# Patient Record
Sex: FEMALE | Race: WHITE | Employment: OTHER | URBAN - METROPOLITAN AREA
[De-identification: names, ages, dates, MRNs, and addresses within clinical notes are randomized per-mention and may not be internally consistent; named-entity substitution may affect disease eponyms.]

---

## 2017-01-06 ENCOUNTER — TELEPHONE (OUTPATIENT)
Dept: INTERNAL MEDICINE CLINIC | Age: 72
End: 2017-01-06

## 2017-01-23 ENCOUNTER — NURSE ONLY (OUTPATIENT)
Dept: INTERNAL MEDICINE CLINIC | Age: 72
End: 2017-01-23

## 2017-01-23 DIAGNOSIS — Z23 NEED FOR HEPATITIS A IMMUNIZATION: Primary | ICD-10-CM

## 2017-01-23 PROCEDURE — 90471 IMMUNIZATION ADMIN: CPT | Performed by: INTERNAL MEDICINE

## 2017-01-23 PROCEDURE — 90632 HEPA VACCINE ADULT IM: CPT | Performed by: INTERNAL MEDICINE

## 2017-01-26 RX ORDER — ESTRADIOL AND NORETHINDRONE ACETATE 1; .5 MG/1; MG/1
TABLET ORAL
Qty: 28 TABLET | Refills: 3 | Status: SHIPPED | OUTPATIENT
Start: 2017-01-26 | End: 2017-03-22 | Stop reason: ALTCHOICE

## 2017-01-27 ENCOUNTER — TELEPHONE (OUTPATIENT)
Dept: INTERNAL MEDICINE CLINIC | Age: 72
End: 2017-01-27

## 2017-01-31 ENCOUNTER — OFFICE VISIT (OUTPATIENT)
Dept: INTERNAL MEDICINE CLINIC | Age: 72
End: 2017-01-31

## 2017-01-31 VITALS
TEMPERATURE: 97.7 F | HEART RATE: 68 BPM | DIASTOLIC BLOOD PRESSURE: 64 MMHG | WEIGHT: 143 LBS | BODY MASS INDEX: 24.41 KG/M2 | HEIGHT: 64 IN | SYSTOLIC BLOOD PRESSURE: 138 MMHG

## 2017-01-31 DIAGNOSIS — Z23 NEED FOR HEPATITIS B VACCINATION: ICD-10-CM

## 2017-01-31 DIAGNOSIS — K57.32 DIVERTICULITIS OF LARGE INTESTINE WITHOUT PERFORATION OR ABSCESS WITHOUT BLEEDING: Primary | ICD-10-CM

## 2017-01-31 PROCEDURE — 90746 HEPB VACCINE 3 DOSE ADULT IM: CPT | Performed by: INTERNAL MEDICINE

## 2017-01-31 PROCEDURE — 99213 OFFICE O/P EST LOW 20 MIN: CPT | Performed by: INTERNAL MEDICINE

## 2017-01-31 PROCEDURE — G0010 ADMIN HEPATITIS B VACCINE: HCPCS | Performed by: INTERNAL MEDICINE

## 2017-01-31 RX ORDER — METRONIDAZOLE 500 MG/1
1 TABLET ORAL
Refills: 0 | COMMUNITY
Start: 2017-01-27 | End: 2017-03-22 | Stop reason: ALTCHOICE

## 2017-01-31 RX ORDER — CIPROFLOXACIN 500 MG/1
1 TABLET, FILM COATED ORAL 2 TIMES DAILY
Refills: 0 | COMMUNITY
Start: 2017-01-27 | End: 2017-03-22 | Stop reason: ALTCHOICE

## 2017-01-31 ASSESSMENT — ENCOUNTER SYMPTOMS
CHEST TIGHTNESS: 0
WHEEZING: 1
ABDOMINAL PAIN: 1
BLOOD IN STOOL: 0
COUGH: 0
CONSTIPATION: 0

## 2017-02-03 ENCOUNTER — TELEPHONE (OUTPATIENT)
Dept: INTERNAL MEDICINE CLINIC | Age: 72
End: 2017-02-03

## 2017-02-13 RX ORDER — ATORVASTATIN CALCIUM 10 MG/1
TABLET, FILM COATED ORAL
Qty: 30 TABLET | Refills: 2 | Status: SHIPPED | OUTPATIENT
Start: 2017-02-13 | End: 2017-03-14 | Stop reason: SDUPTHER

## 2017-03-01 ENCOUNTER — NURSE ONLY (OUTPATIENT)
Dept: INTERNAL MEDICINE CLINIC | Age: 72
End: 2017-03-01

## 2017-03-01 DIAGNOSIS — Z23 NEED FOR HEPATITIS B VACCINATION: Primary | ICD-10-CM

## 2017-03-01 PROCEDURE — G0010 ADMIN HEPATITIS B VACCINE: HCPCS | Performed by: INTERNAL MEDICINE

## 2017-03-01 PROCEDURE — 90746 HEPB VACCINE 3 DOSE ADULT IM: CPT | Performed by: INTERNAL MEDICINE

## 2017-03-14 ENCOUNTER — TELEPHONE (OUTPATIENT)
Dept: INTERNAL MEDICINE CLINIC | Age: 72
End: 2017-03-14

## 2017-03-14 RX ORDER — ATORVASTATIN CALCIUM 10 MG/1
TABLET, FILM COATED ORAL
Qty: 30 TABLET | Refills: 2 | Status: SHIPPED | OUTPATIENT
Start: 2017-03-14 | End: 2017-03-22 | Stop reason: SDUPTHER

## 2017-03-22 ENCOUNTER — PROCEDURE VISIT (OUTPATIENT)
Dept: INTERNAL MEDICINE CLINIC | Age: 72
End: 2017-03-22

## 2017-03-22 VITALS
HEIGHT: 64 IN | DIASTOLIC BLOOD PRESSURE: 70 MMHG | WEIGHT: 142 LBS | SYSTOLIC BLOOD PRESSURE: 120 MMHG | BODY MASS INDEX: 24.24 KG/M2 | TEMPERATURE: 97.5 F | HEART RATE: 76 BPM

## 2017-03-22 DIAGNOSIS — E78.5 ELEVATED LIPIDS: ICD-10-CM

## 2017-03-22 DIAGNOSIS — R91.1 PULMONARY NODULE: ICD-10-CM

## 2017-03-22 DIAGNOSIS — M19.049 ARTHRITIS OF HAND: Primary | ICD-10-CM

## 2017-03-22 DIAGNOSIS — B37.2 CANDIDAL INTERTRIGO: ICD-10-CM

## 2017-03-22 DIAGNOSIS — Z01.818 PREOP EXAMINATION: ICD-10-CM

## 2017-03-22 PROCEDURE — 99214 OFFICE O/P EST MOD 30 MIN: CPT | Performed by: INTERNAL MEDICINE

## 2017-03-22 RX ORDER — NYSTATIN 100000 [USP'U]/G
POWDER TOPICAL
Qty: 60 G | Refills: 1 | Status: SHIPPED | OUTPATIENT
Start: 2017-03-22 | End: 2017-06-12 | Stop reason: SDUPTHER

## 2017-03-22 RX ORDER — ATORVASTATIN CALCIUM 10 MG/1
TABLET, FILM COATED ORAL
Qty: 90 TABLET | Refills: 2 | OUTPATIENT
Start: 2017-03-22 | End: 2017-12-14 | Stop reason: SDUPTHER

## 2017-03-22 ASSESSMENT — ENCOUNTER SYMPTOMS
WHEEZING: 0
COUGH: 0
SHORTNESS OF BREATH: 0
CHEST TIGHTNESS: 0

## 2017-03-23 ENCOUNTER — TELEPHONE (OUTPATIENT)
Dept: INTERNAL MEDICINE CLINIC | Age: 72
End: 2017-03-23

## 2017-03-23 LAB
ANION GAP SERPL CALCULATED.3IONS-SCNC: 18 MMOL/L (ref 3–16)
BUN BLDV-MCNC: 16 MG/DL (ref 7–20)
CALCIUM SERPL-MCNC: 9.8 MG/DL (ref 8.3–10.6)
CHLORIDE BLD-SCNC: 100 MMOL/L (ref 99–110)
CO2: 25 MMOL/L (ref 21–32)
CREAT SERPL-MCNC: 0.7 MG/DL (ref 0.6–1.2)
GFR AFRICAN AMERICAN: >60
GFR NON-AFRICAN AMERICAN: >60
GLUCOSE BLD-MCNC: 77 MG/DL (ref 70–99)
POTASSIUM SERPL-SCNC: 4.5 MMOL/L (ref 3.5–5.1)
SODIUM BLD-SCNC: 143 MMOL/L (ref 136–145)

## 2017-03-27 ENCOUNTER — TELEPHONE (OUTPATIENT)
Dept: INTERNAL MEDICINE CLINIC | Age: 72
End: 2017-03-27

## 2017-04-07 ENCOUNTER — TELEPHONE (OUTPATIENT)
Dept: INTERNAL MEDICINE CLINIC | Age: 72
End: 2017-04-07

## 2017-04-13 ENCOUNTER — TELEPHONE (OUTPATIENT)
Dept: INTERNAL MEDICINE CLINIC | Age: 72
End: 2017-04-13

## 2017-04-13 ENCOUNTER — OFFICE VISIT (OUTPATIENT)
Dept: INTERNAL MEDICINE CLINIC | Age: 72
End: 2017-04-13

## 2017-04-13 VITALS
BODY MASS INDEX: 23.87 KG/M2 | HEART RATE: 72 BPM | WEIGHT: 139.8 LBS | DIASTOLIC BLOOD PRESSURE: 70 MMHG | TEMPERATURE: 98 F | HEIGHT: 64 IN | SYSTOLIC BLOOD PRESSURE: 120 MMHG

## 2017-04-13 DIAGNOSIS — B37.2 CANDIDAL INTERTRIGO: ICD-10-CM

## 2017-04-13 DIAGNOSIS — N95.1 SYMPTOMATIC MENOPAUSAL OR FEMALE CLIMACTERIC STATES: ICD-10-CM

## 2017-04-13 DIAGNOSIS — Z01.818 PREOP EXAMINATION: Primary | ICD-10-CM

## 2017-04-13 PROCEDURE — 99213 OFFICE O/P EST LOW 20 MIN: CPT | Performed by: INTERNAL MEDICINE

## 2017-04-13 PROCEDURE — 93000 ELECTROCARDIOGRAM COMPLETE: CPT | Performed by: INTERNAL MEDICINE

## 2017-04-13 RX ORDER — VENLAFAXINE HYDROCHLORIDE 75 MG/1
150 CAPSULE, EXTENDED RELEASE ORAL DAILY
Qty: 30 CAPSULE | Refills: 3 | Status: SHIPPED | OUTPATIENT
Start: 2017-04-13 | End: 2017-04-13 | Stop reason: SDUPTHER

## 2017-04-13 RX ORDER — VENLAFAXINE HYDROCHLORIDE 75 MG/1
75 CAPSULE, EXTENDED RELEASE ORAL DAILY
Qty: 30 CAPSULE | Refills: 3 | Status: SHIPPED | OUTPATIENT
Start: 2017-04-13 | End: 2017-04-24

## 2017-04-13 ASSESSMENT — ENCOUNTER SYMPTOMS
WHEEZING: 0
CHEST TIGHTNESS: 0
COUGH: 0

## 2017-05-10 ENCOUNTER — TELEPHONE (OUTPATIENT)
Dept: INTERNAL MEDICINE CLINIC | Age: 72
End: 2017-05-10

## 2017-05-31 ENCOUNTER — TELEPHONE (OUTPATIENT)
Dept: INTERNAL MEDICINE CLINIC | Age: 72
End: 2017-05-31

## 2017-06-12 ENCOUNTER — TELEPHONE (OUTPATIENT)
Dept: INTERNAL MEDICINE CLINIC | Age: 72
End: 2017-06-12

## 2017-06-12 RX ORDER — NYSTATIN 100000 [USP'U]/G
POWDER TOPICAL
Qty: 60 G | Refills: 3 | Status: SHIPPED | OUTPATIENT
Start: 2017-06-12 | End: 2017-06-13 | Stop reason: SDUPTHER

## 2017-06-13 RX ORDER — NYSTATIN 100000 [USP'U]/G
POWDER TOPICAL
Qty: 120 G | Refills: 3 | Status: SHIPPED | OUTPATIENT
Start: 2017-06-13

## 2017-06-14 ENCOUNTER — TELEPHONE (OUTPATIENT)
Dept: INTERNAL MEDICINE CLINIC | Age: 72
End: 2017-06-14

## 2017-06-27 DIAGNOSIS — R93.89 ABNORMAL CT SCAN, CHEST: Primary | ICD-10-CM

## 2017-07-10 ENCOUNTER — TELEPHONE (OUTPATIENT)
Dept: INTERNAL MEDICINE CLINIC | Age: 72
End: 2017-07-10

## 2017-07-12 ENCOUNTER — TELEPHONE (OUTPATIENT)
Dept: INTERNAL MEDICINE CLINIC | Age: 72
End: 2017-07-12

## 2017-07-25 ENCOUNTER — NURSE ONLY (OUTPATIENT)
Dept: INTERNAL MEDICINE CLINIC | Age: 72
End: 2017-07-25

## 2017-07-25 DIAGNOSIS — Z23 NEED FOR HEPATITIS B VACCINATION: ICD-10-CM

## 2017-07-25 DIAGNOSIS — Z23 NEED FOR HEPATITIS A VACCINATION: Primary | ICD-10-CM

## 2017-07-25 PROCEDURE — 90632 HEPA VACCINE ADULT IM: CPT | Performed by: INTERNAL MEDICINE

## 2017-07-25 PROCEDURE — 90471 IMMUNIZATION ADMIN: CPT | Performed by: INTERNAL MEDICINE

## 2017-07-25 PROCEDURE — 90746 HEPB VACCINE 3 DOSE ADULT IM: CPT | Performed by: INTERNAL MEDICINE

## 2017-07-25 PROCEDURE — G0010 ADMIN HEPATITIS B VACCINE: HCPCS | Performed by: INTERNAL MEDICINE

## 2017-08-30 RX ORDER — ATOVAQUONE AND PROGUANIL HYDROCHLORIDE 250; 100 MG/1; MG/1
TABLET, FILM COATED ORAL
Qty: 23 TABLET | Refills: 0 | Status: SHIPPED | OUTPATIENT
Start: 2017-08-30 | End: 2017-10-25 | Stop reason: ALTCHOICE

## 2017-08-31 ENCOUNTER — NURSE ONLY (OUTPATIENT)
Dept: INTERNAL MEDICINE CLINIC | Age: 72
End: 2017-08-31

## 2017-08-31 DIAGNOSIS — Z23 FLU VACCINE NEED: Primary | ICD-10-CM

## 2017-08-31 PROCEDURE — G0008 ADMIN INFLUENZA VIRUS VAC: HCPCS | Performed by: INTERNAL MEDICINE

## 2017-08-31 PROCEDURE — 90662 IIV NO PRSV INCREASED AG IM: CPT | Performed by: INTERNAL MEDICINE

## 2017-09-13 RX ORDER — CIPROFLOXACIN 250 MG/1
250 TABLET, FILM COATED ORAL 2 TIMES DAILY
Qty: 6 TABLET | Refills: 0 | Status: SHIPPED | OUTPATIENT
Start: 2017-09-13 | End: 2017-09-16

## 2017-09-13 RX ORDER — AZITHROMYCIN 250 MG/1
TABLET, FILM COATED ORAL
Qty: 1 PACKET | Refills: 0 | Status: SHIPPED | OUTPATIENT
Start: 2017-09-13 | End: 2017-09-23

## 2017-10-25 ENCOUNTER — OFFICE VISIT (OUTPATIENT)
Dept: INTERNAL MEDICINE CLINIC | Age: 72
End: 2017-10-25

## 2017-10-25 VITALS
HEIGHT: 64 IN | WEIGHT: 142 LBS | BODY MASS INDEX: 24.24 KG/M2 | DIASTOLIC BLOOD PRESSURE: 60 MMHG | TEMPERATURE: 97.9 F | SYSTOLIC BLOOD PRESSURE: 110 MMHG | HEART RATE: 92 BPM

## 2017-10-25 DIAGNOSIS — M10.9 ACUTE GOUT INVOLVING TOE OF RIGHT FOOT, UNSPECIFIED CAUSE: Primary | ICD-10-CM

## 2017-10-25 DIAGNOSIS — M10.9 ACUTE GOUT INVOLVING TOE OF RIGHT FOOT, UNSPECIFIED CAUSE: ICD-10-CM

## 2017-10-25 LAB
ANION GAP SERPL CALCULATED.3IONS-SCNC: 11 MMOL/L (ref 3–16)
BUN BLDV-MCNC: 14 MG/DL (ref 7–20)
CALCIUM SERPL-MCNC: 9.6 MG/DL (ref 8.3–10.6)
CHLORIDE BLD-SCNC: 101 MMOL/L (ref 99–110)
CO2: 28 MMOL/L (ref 21–32)
CREAT SERPL-MCNC: 0.6 MG/DL (ref 0.6–1.2)
GFR AFRICAN AMERICAN: >60
GFR NON-AFRICAN AMERICAN: >60
GLUCOSE BLD-MCNC: 97 MG/DL (ref 70–99)
POTASSIUM SERPL-SCNC: 4.6 MMOL/L (ref 3.5–5.1)
SEDIMENTATION RATE, ERYTHROCYTE: 22 MM/HR (ref 0–30)
SODIUM BLD-SCNC: 140 MMOL/L (ref 136–145)
URIC ACID, SERUM: 3 MG/DL (ref 2.6–6)

## 2017-10-25 PROCEDURE — 1036F TOBACCO NON-USER: CPT | Performed by: INTERNAL MEDICINE

## 2017-10-25 PROCEDURE — G8598 ASA/ANTIPLAT THER USED: HCPCS | Performed by: INTERNAL MEDICINE

## 2017-10-25 PROCEDURE — 1090F PRES/ABSN URINE INCON ASSESS: CPT | Performed by: INTERNAL MEDICINE

## 2017-10-25 PROCEDURE — G8399 PT W/DXA RESULTS DOCUMENT: HCPCS | Performed by: INTERNAL MEDICINE

## 2017-10-25 PROCEDURE — G8484 FLU IMMUNIZE NO ADMIN: HCPCS | Performed by: INTERNAL MEDICINE

## 2017-10-25 PROCEDURE — 4040F PNEUMOC VAC/ADMIN/RCVD: CPT | Performed by: INTERNAL MEDICINE

## 2017-10-25 PROCEDURE — 3014F SCREEN MAMMO DOC REV: CPT | Performed by: INTERNAL MEDICINE

## 2017-10-25 PROCEDURE — 1123F ACP DISCUSS/DSCN MKR DOCD: CPT | Performed by: INTERNAL MEDICINE

## 2017-10-25 PROCEDURE — G8427 DOCREV CUR MEDS BY ELIG CLIN: HCPCS | Performed by: INTERNAL MEDICINE

## 2017-10-25 PROCEDURE — 99213 OFFICE O/P EST LOW 20 MIN: CPT | Performed by: INTERNAL MEDICINE

## 2017-10-25 PROCEDURE — G8420 CALC BMI NORM PARAMETERS: HCPCS | Performed by: INTERNAL MEDICINE

## 2017-10-25 PROCEDURE — 3017F COLORECTAL CA SCREEN DOC REV: CPT | Performed by: INTERNAL MEDICINE

## 2017-10-25 RX ORDER — COLCHICINE 0.6 MG/1
TABLET ORAL
Qty: 20 TABLET | Refills: 0 | Status: SHIPPED | OUTPATIENT
Start: 2017-10-25 | End: 2017-12-05 | Stop reason: ALTCHOICE

## 2017-10-25 NOTE — PATIENT INSTRUCTIONS
Take the colcrys 2 tablets now and repeat times one in 1 hour  Tomorrow start one tablet twice a day for 1 week   You may also take the Ibuprofen with food

## 2017-12-05 ENCOUNTER — OFFICE VISIT (OUTPATIENT)
Dept: INTERNAL MEDICINE CLINIC | Age: 72
End: 2017-12-05

## 2017-12-05 VITALS
WEIGHT: 141.2 LBS | SYSTOLIC BLOOD PRESSURE: 120 MMHG | DIASTOLIC BLOOD PRESSURE: 70 MMHG | HEART RATE: 100 BPM | TEMPERATURE: 97.9 F | HEIGHT: 64 IN | BODY MASS INDEX: 24.11 KG/M2

## 2017-12-05 DIAGNOSIS — R13.10 DYSPHAGIA, UNSPECIFIED TYPE: ICD-10-CM

## 2017-12-05 DIAGNOSIS — R07.9 CHEST PAIN, UNSPECIFIED TYPE: Primary | ICD-10-CM

## 2017-12-05 PROCEDURE — 1090F PRES/ABSN URINE INCON ASSESS: CPT | Performed by: INTERNAL MEDICINE

## 2017-12-05 PROCEDURE — G8598 ASA/ANTIPLAT THER USED: HCPCS | Performed by: INTERNAL MEDICINE

## 2017-12-05 PROCEDURE — 1123F ACP DISCUSS/DSCN MKR DOCD: CPT | Performed by: INTERNAL MEDICINE

## 2017-12-05 PROCEDURE — 4040F PNEUMOC VAC/ADMIN/RCVD: CPT | Performed by: INTERNAL MEDICINE

## 2017-12-05 PROCEDURE — 1036F TOBACCO NON-USER: CPT | Performed by: INTERNAL MEDICINE

## 2017-12-05 PROCEDURE — G8484 FLU IMMUNIZE NO ADMIN: HCPCS | Performed by: INTERNAL MEDICINE

## 2017-12-05 PROCEDURE — 99213 OFFICE O/P EST LOW 20 MIN: CPT | Performed by: INTERNAL MEDICINE

## 2017-12-05 PROCEDURE — 3017F COLORECTAL CA SCREEN DOC REV: CPT | Performed by: INTERNAL MEDICINE

## 2017-12-05 PROCEDURE — G8399 PT W/DXA RESULTS DOCUMENT: HCPCS | Performed by: INTERNAL MEDICINE

## 2017-12-05 PROCEDURE — G8427 DOCREV CUR MEDS BY ELIG CLIN: HCPCS | Performed by: INTERNAL MEDICINE

## 2017-12-05 PROCEDURE — 3014F SCREEN MAMMO DOC REV: CPT | Performed by: INTERNAL MEDICINE

## 2017-12-05 PROCEDURE — G8420 CALC BMI NORM PARAMETERS: HCPCS | Performed by: INTERNAL MEDICINE

## 2017-12-15 RX ORDER — ATORVASTATIN CALCIUM 10 MG/1
TABLET, FILM COATED ORAL
Qty: 90 TABLET | Refills: 0 | Status: SHIPPED | OUTPATIENT
Start: 2017-12-15 | End: 2018-03-19 | Stop reason: SDUPTHER

## 2018-01-31 DIAGNOSIS — R13.10 DYSPHAGIA, UNSPECIFIED TYPE: Primary | ICD-10-CM

## 2018-03-19 ENCOUNTER — OFFICE VISIT (OUTPATIENT)
Dept: INTERNAL MEDICINE CLINIC | Age: 73
End: 2018-03-19

## 2018-03-19 VITALS
DIASTOLIC BLOOD PRESSURE: 70 MMHG | HEART RATE: 89 BPM | TEMPERATURE: 97.7 F | BODY MASS INDEX: 23.76 KG/M2 | WEIGHT: 139.2 LBS | HEIGHT: 64 IN | SYSTOLIC BLOOD PRESSURE: 110 MMHG

## 2018-03-19 DIAGNOSIS — M16.0 OSTEOARTHRITIS OF BOTH HIPS, UNSPECIFIED OSTEOARTHRITIS TYPE: ICD-10-CM

## 2018-03-19 DIAGNOSIS — E78.00 ELEVATED CHOLESTEROL: ICD-10-CM

## 2018-03-19 DIAGNOSIS — R91.1 PULMONARY NODULE: ICD-10-CM

## 2018-03-19 DIAGNOSIS — N95.1 HOT FLASHES DUE TO MENOPAUSE: Primary | ICD-10-CM

## 2018-03-19 LAB
A/G RATIO: 1.7 (ref 1.1–2.2)
ALBUMIN SERPL-MCNC: 4.7 G/DL (ref 3.4–5)
ALP BLD-CCNC: 80 U/L (ref 40–129)
ALT SERPL-CCNC: 17 U/L (ref 10–40)
ANION GAP SERPL CALCULATED.3IONS-SCNC: 20 MMOL/L (ref 3–16)
AST SERPL-CCNC: 20 U/L (ref 15–37)
BILIRUB SERPL-MCNC: 0.4 MG/DL (ref 0–1)
BUN BLDV-MCNC: 17 MG/DL (ref 7–20)
CALCIUM SERPL-MCNC: 9.3 MG/DL (ref 8.3–10.6)
CHLORIDE BLD-SCNC: 105 MMOL/L (ref 99–110)
CHOLESTEROL, TOTAL: 177 MG/DL (ref 0–199)
CO2: 23 MMOL/L (ref 21–32)
CREAT SERPL-MCNC: 0.7 MG/DL (ref 0.6–1.2)
GFR AFRICAN AMERICAN: >60
GFR NON-AFRICAN AMERICAN: >60
GLOBULIN: 2.7 G/DL
GLUCOSE BLD-MCNC: 83 MG/DL (ref 70–99)
HDLC SERPL-MCNC: 59 MG/DL (ref 40–60)
LDL CHOLESTEROL CALCULATED: 87 MG/DL
POTASSIUM SERPL-SCNC: 4.3 MMOL/L (ref 3.5–5.1)
SODIUM BLD-SCNC: 148 MMOL/L (ref 136–145)
TOTAL PROTEIN: 7.4 G/DL (ref 6.4–8.2)
TRIGL SERPL-MCNC: 156 MG/DL (ref 0–150)
VLDLC SERPL CALC-MCNC: 31 MG/DL

## 2018-03-19 PROCEDURE — 1036F TOBACCO NON-USER: CPT | Performed by: INTERNAL MEDICINE

## 2018-03-19 PROCEDURE — 1123F ACP DISCUSS/DSCN MKR DOCD: CPT | Performed by: INTERNAL MEDICINE

## 2018-03-19 PROCEDURE — 1090F PRES/ABSN URINE INCON ASSESS: CPT | Performed by: INTERNAL MEDICINE

## 2018-03-19 PROCEDURE — G8427 DOCREV CUR MEDS BY ELIG CLIN: HCPCS | Performed by: INTERNAL MEDICINE

## 2018-03-19 PROCEDURE — 3017F COLORECTAL CA SCREEN DOC REV: CPT | Performed by: INTERNAL MEDICINE

## 2018-03-19 PROCEDURE — G8420 CALC BMI NORM PARAMETERS: HCPCS | Performed by: INTERNAL MEDICINE

## 2018-03-19 PROCEDURE — 3014F SCREEN MAMMO DOC REV: CPT | Performed by: INTERNAL MEDICINE

## 2018-03-19 PROCEDURE — G8399 PT W/DXA RESULTS DOCUMENT: HCPCS | Performed by: INTERNAL MEDICINE

## 2018-03-19 PROCEDURE — 4040F PNEUMOC VAC/ADMIN/RCVD: CPT | Performed by: INTERNAL MEDICINE

## 2018-03-19 PROCEDURE — 99214 OFFICE O/P EST MOD 30 MIN: CPT | Performed by: INTERNAL MEDICINE

## 2018-03-19 PROCEDURE — G8482 FLU IMMUNIZE ORDER/ADMIN: HCPCS | Performed by: INTERNAL MEDICINE

## 2018-03-19 RX ORDER — CALCIUM CARBONATE 500(1250)
500 TABLET ORAL DAILY
COMMUNITY

## 2018-03-19 RX ORDER — ASPIRIN 81 MG/1
81 TABLET ORAL DAILY
Qty: 30 TABLET | Refills: 3 | COMMUNITY
Start: 2018-03-19

## 2018-03-19 RX ORDER — ATORVASTATIN CALCIUM 10 MG/1
TABLET, FILM COATED ORAL
Qty: 90 TABLET | Refills: 3 | Status: SHIPPED | OUTPATIENT
Start: 2018-03-19 | End: 2019-03-07 | Stop reason: SDUPTHER

## 2018-03-19 RX ORDER — ZINC GLUCONATE 50 MG
50 TABLET ORAL DAILY
COMMUNITY

## 2018-03-19 ASSESSMENT — ENCOUNTER SYMPTOMS
COUGH: 0
WHEEZING: 0
APNEA: 0
SHORTNESS OF BREATH: 0

## 2018-03-19 ASSESSMENT — PATIENT HEALTH QUESTIONNAIRE - PHQ9
SUM OF ALL RESPONSES TO PHQ9 QUESTIONS 1 & 2: 0
1. LITTLE INTEREST OR PLEASURE IN DOING THINGS: 0
SUM OF ALL RESPONSES TO PHQ QUESTIONS 1-9: 0
2. FEELING DOWN, DEPRESSED OR HOPELESS: 0

## 2018-03-19 NOTE — PROGRESS NOTES
Subjective:      Patient ID: Alexa Copeland is a 71 y.o. female. HPI here for annual and follow up hot flashes and medication  Her medication is expensive  Cost $130 dollars for 2 months  She tried alternatives including Premarin and Mimvey   It was more expensive and had vaginal     She has OA of the hip   She reports that she has received an injection in the right hip but will be needing hip replacement after her trip to [facility] in the fall    Prior to Visit Medications    Medication Sig Taking? Authorizing Provider   calcium carbonate (OSCAL) 500 MG TABS tablet Take 500 mg by mouth daily Yes Historical Provider, MD   zinc gluconate 50 MG tablet Take 50 mg by mouth daily Yes Historical Provider, MD   atorvastatin (LIPITOR) 10 MG tablet TAKE 1 TABLET EVERY DAY Yes Nidia Sepulveda MD   aspirin EC 81 MG EC tablet Take 1 tablet by mouth daily Yes Nidia Sepulveda MD   Estradiol-Norgestimate (PREFEST) 1/1-0.09 MG (15/15) TABS Take 0.5 tablets by mouth daily Yes Nidia Sepulveda MD   nystatin (MYCOSTATIN) 657247 UNIT/GM powder Apply topically 4 times daily. Yes Nidia Sepulveda MD   Multiple Vitamins-Minerals (THERAPEUTIC MULTIVITAMIN-MINERALS) tablet Take 1 tablet by mouth daily. Yes Historical Provider, MD   b complex vitamins capsule Take 1 capsule by mouth daily. Yes Historical Provider, MD   Ibuprofen-Diphenhydramine Cit (ADVIL PM PO) Take  by mouth. Yes Historical Provider, MD         Health maintenance reviewed and appropriate screenings, immunizations discussed   Patient's current med list, family, social history and past medical history reviewed and updated today        Review of Systems   Constitutional:        Hot flashes     Respiratory: Negative for apnea, cough, shortness of breath and wheezing.     Cardiovascular: Positive for chest pain (she has had a vague chest pain that has been present off and on for well over a year   she has had cardiology evaluation, CT of chest and upper and lower shingles

## 2018-06-05 ENCOUNTER — OFFICE VISIT (OUTPATIENT)
Dept: INTERNAL MEDICINE CLINIC | Age: 73
End: 2018-06-05

## 2018-06-05 VITALS
HEART RATE: 94 BPM | WEIGHT: 139.8 LBS | HEIGHT: 64 IN | TEMPERATURE: 97.7 F | BODY MASS INDEX: 23.87 KG/M2 | DIASTOLIC BLOOD PRESSURE: 60 MMHG | SYSTOLIC BLOOD PRESSURE: 126 MMHG

## 2018-06-05 DIAGNOSIS — R07.9 CHEST PAIN, UNSPECIFIED TYPE: ICD-10-CM

## 2018-06-05 DIAGNOSIS — R07.9 CHEST PAIN, UNSPECIFIED TYPE: Primary | ICD-10-CM

## 2018-06-05 LAB
BASOPHILS ABSOLUTE: 0 K/UL (ref 0–0.2)
BASOPHILS RELATIVE PERCENT: 0.4 %
C-REACTIVE PROTEIN: 1.8 MG/L (ref 0–5.1)
EOSINOPHILS ABSOLUTE: 0.1 K/UL (ref 0–0.6)
EOSINOPHILS RELATIVE PERCENT: 1.8 %
HCT VFR BLD CALC: 43.2 % (ref 36–48)
HEMOGLOBIN: 14.4 G/DL (ref 12–16)
LYMPHOCYTES ABSOLUTE: 1.2 K/UL (ref 1–5.1)
LYMPHOCYTES RELATIVE PERCENT: 15.8 %
MCH RBC QN AUTO: 29.5 PG (ref 26–34)
MCHC RBC AUTO-ENTMCNC: 33.3 G/DL (ref 31–36)
MCV RBC AUTO: 88.5 FL (ref 80–100)
MONOCYTES ABSOLUTE: 0.5 K/UL (ref 0–1.3)
MONOCYTES RELATIVE PERCENT: 6.7 %
NEUTROPHILS ABSOLUTE: 5.9 K/UL (ref 1.7–7.7)
NEUTROPHILS RELATIVE PERCENT: 75.3 %
PDW BLD-RTO: 13.9 % (ref 12.4–15.4)
PLATELET # BLD: 255 K/UL (ref 135–450)
PMV BLD AUTO: 8.8 FL (ref 5–10.5)
RBC # BLD: 4.88 M/UL (ref 4–5.2)
SEDIMENTATION RATE, ERYTHROCYTE: 12 MM/HR (ref 0–30)
WBC # BLD: 7.8 K/UL (ref 4–11)

## 2018-06-05 PROCEDURE — 1090F PRES/ABSN URINE INCON ASSESS: CPT | Performed by: INTERNAL MEDICINE

## 2018-06-05 PROCEDURE — G8399 PT W/DXA RESULTS DOCUMENT: HCPCS | Performed by: INTERNAL MEDICINE

## 2018-06-05 PROCEDURE — 93000 ELECTROCARDIOGRAM COMPLETE: CPT | Performed by: INTERNAL MEDICINE

## 2018-06-05 PROCEDURE — 99214 OFFICE O/P EST MOD 30 MIN: CPT | Performed by: INTERNAL MEDICINE

## 2018-06-05 PROCEDURE — 3014F SCREEN MAMMO DOC REV: CPT | Performed by: INTERNAL MEDICINE

## 2018-06-05 PROCEDURE — 4040F PNEUMOC VAC/ADMIN/RCVD: CPT | Performed by: INTERNAL MEDICINE

## 2018-06-05 PROCEDURE — 3017F COLORECTAL CA SCREEN DOC REV: CPT | Performed by: INTERNAL MEDICINE

## 2018-06-05 PROCEDURE — G8427 DOCREV CUR MEDS BY ELIG CLIN: HCPCS | Performed by: INTERNAL MEDICINE

## 2018-06-05 PROCEDURE — 1123F ACP DISCUSS/DSCN MKR DOCD: CPT | Performed by: INTERNAL MEDICINE

## 2018-06-05 PROCEDURE — G8420 CALC BMI NORM PARAMETERS: HCPCS | Performed by: INTERNAL MEDICINE

## 2018-06-05 PROCEDURE — 1036F TOBACCO NON-USER: CPT | Performed by: INTERNAL MEDICINE

## 2018-06-05 ASSESSMENT — ENCOUNTER SYMPTOMS
WHEEZING: 0
DIARRHEA: 0
NAUSEA: 0
CONSTIPATION: 0
VOMITING: 0
SHORTNESS OF BREATH: 0

## 2018-07-05 DIAGNOSIS — R13.10 DYSPHAGIA, UNSPECIFIED TYPE: Primary | ICD-10-CM

## 2018-07-17 ENCOUNTER — OFFICE VISIT (OUTPATIENT)
Dept: INTERNAL MEDICINE CLINIC | Age: 73
End: 2018-07-17

## 2018-07-17 VITALS
HEART RATE: 88 BPM | DIASTOLIC BLOOD PRESSURE: 62 MMHG | BODY MASS INDEX: 23.83 KG/M2 | SYSTOLIC BLOOD PRESSURE: 120 MMHG | HEIGHT: 64 IN | WEIGHT: 139.6 LBS | TEMPERATURE: 97.8 F

## 2018-07-17 DIAGNOSIS — M11.262 PSEUDOGOUT OF LEFT KNEE: Primary | ICD-10-CM

## 2018-07-17 PROCEDURE — 4040F PNEUMOC VAC/ADMIN/RCVD: CPT | Performed by: INTERNAL MEDICINE

## 2018-07-17 PROCEDURE — 3014F SCREEN MAMMO DOC REV: CPT | Performed by: INTERNAL MEDICINE

## 2018-07-17 PROCEDURE — 1101F PT FALLS ASSESS-DOCD LE1/YR: CPT | Performed by: INTERNAL MEDICINE

## 2018-07-17 PROCEDURE — G8420 CALC BMI NORM PARAMETERS: HCPCS | Performed by: INTERNAL MEDICINE

## 2018-07-17 PROCEDURE — 1036F TOBACCO NON-USER: CPT | Performed by: INTERNAL MEDICINE

## 2018-07-17 PROCEDURE — 3017F COLORECTAL CA SCREEN DOC REV: CPT | Performed by: INTERNAL MEDICINE

## 2018-07-17 PROCEDURE — G8427 DOCREV CUR MEDS BY ELIG CLIN: HCPCS | Performed by: INTERNAL MEDICINE

## 2018-07-17 PROCEDURE — 99213 OFFICE O/P EST LOW 20 MIN: CPT | Performed by: INTERNAL MEDICINE

## 2018-07-17 PROCEDURE — G8399 PT W/DXA RESULTS DOCUMENT: HCPCS | Performed by: INTERNAL MEDICINE

## 2018-07-17 PROCEDURE — 1090F PRES/ABSN URINE INCON ASSESS: CPT | Performed by: INTERNAL MEDICINE

## 2018-07-17 PROCEDURE — 1123F ACP DISCUSS/DSCN MKR DOCD: CPT | Performed by: INTERNAL MEDICINE

## 2018-07-17 RX ORDER — COLCHICINE 0.6 MG/1
TABLET ORAL
Qty: 60 TABLET | Refills: 3 | Status: SHIPPED | OUTPATIENT
Start: 2018-07-17 | End: 2018-09-04 | Stop reason: ALTCHOICE

## 2018-07-17 RX ORDER — NAPROXEN 375 MG/1
375 TABLET ORAL 2 TIMES DAILY WITH MEALS
Qty: 60 TABLET | Refills: 0 | Status: SHIPPED | OUTPATIENT
Start: 2018-07-17 | End: 2018-09-04 | Stop reason: ALTCHOICE

## 2018-07-17 NOTE — PROGRESS NOTES
Subjective:      Patient ID: Alecia Sommer is a 71 y.o. female. HPI here for left knee pain   Had knee aspiration and cortisone injection on July 6 left knee by ortho   She felt much better initially   She feels now she is having more pain and swelling. She went to beach and over the weekend on 7/13 had increased pain and swelling  She tried 800 Ibuprofen with some relief but not lasting. She also had used Aleve which has helped some     While at the beach she did not do any excessive activity other than some walking  She did drive to [facility] but took 2 days to drive it    She did contact ortho  Recommend ice and elevation  She was told by ortho gout ( looking at note it stated calcium pyrophosphate)    Prior to Visit Medications    Medication Sig Taking? Authorizing Provider   calcium carbonate (OSCAL) 500 MG TABS tablet Take 500 mg by mouth daily Yes Historical Provider, MD   zinc gluconate 50 MG tablet Take 50 mg by mouth daily Yes Historical Provider, MD   atorvastatin (LIPITOR) 10 MG tablet TAKE 1 TABLET EVERY DAY Yes Lori Price MD   aspirin EC 81 MG EC tablet Take 1 tablet by mouth daily Yes Lori Price MD   Estradiol-Norgestimate (PREFEST) 1/1-0.09 MG (15/15) TABS Take 0.5 tablets by mouth daily Yes Lori Price MD   nystatin (MYCOSTATIN) 749650 UNIT/GM powder Apply topically 4 times daily. Yes Lori Price MD   Multiple Vitamins-Minerals (THERAPEUTIC MULTIVITAMIN-MINERALS) tablet Take 1 tablet by mouth daily. Yes Historical Provider, MD   b complex vitamins capsule Take 1 capsule by mouth daily. Yes Historical Provider, MD   Ibuprofen-Diphenhydramine Cit (ADVIL PM PO) Take  by mouth. Yes Historical Provider, MD       Review of Systems   Constitutional: Negative for fever. Musculoskeletal: Positive for arthralgias. Objective:   Physical Exam   Constitutional: She appears well-developed and well-nourished.    Walks with assist of cane     Musculoskeletal:

## 2018-07-20 ENCOUNTER — TELEPHONE (OUTPATIENT)
Dept: INTERNAL MEDICINE CLINIC | Age: 73
End: 2018-07-20

## 2018-09-04 ENCOUNTER — PROCEDURE VISIT (OUTPATIENT)
Dept: INTERNAL MEDICINE CLINIC | Age: 73
End: 2018-09-04

## 2018-09-04 VITALS
HEART RATE: 80 BPM | BODY MASS INDEX: 23.52 KG/M2 | DIASTOLIC BLOOD PRESSURE: 62 MMHG | HEIGHT: 64 IN | WEIGHT: 137.8 LBS | TEMPERATURE: 98.1 F | SYSTOLIC BLOOD PRESSURE: 126 MMHG

## 2018-09-04 DIAGNOSIS — M16.11 PRIMARY OSTEOARTHRITIS OF RIGHT HIP: Primary | ICD-10-CM

## 2018-09-04 DIAGNOSIS — E78.00 ELEVATED CHOLESTEROL: ICD-10-CM

## 2018-09-04 DIAGNOSIS — G89.29 CHRONIC PAIN OF LEFT KNEE: ICD-10-CM

## 2018-09-04 DIAGNOSIS — M25.562 CHRONIC PAIN OF LEFT KNEE: ICD-10-CM

## 2018-09-04 DIAGNOSIS — M25.462 EFFUSION OF LEFT KNEE: ICD-10-CM

## 2018-09-04 DIAGNOSIS — I78.1 SPIDER VEINS: ICD-10-CM

## 2018-09-04 DIAGNOSIS — I45.10 RBBB: ICD-10-CM

## 2018-09-04 LAB
RHEUMATOID FACTOR: <10 IU/ML
SEDIMENTATION RATE, ERYTHROCYTE: 11 MM/HR (ref 0–30)

## 2018-09-04 PROCEDURE — G8427 DOCREV CUR MEDS BY ELIG CLIN: HCPCS | Performed by: INTERNAL MEDICINE

## 2018-09-04 PROCEDURE — G8399 PT W/DXA RESULTS DOCUMENT: HCPCS | Performed by: INTERNAL MEDICINE

## 2018-09-04 PROCEDURE — 3017F COLORECTAL CA SCREEN DOC REV: CPT | Performed by: INTERNAL MEDICINE

## 2018-09-04 PROCEDURE — G8420 CALC BMI NORM PARAMETERS: HCPCS | Performed by: INTERNAL MEDICINE

## 2018-09-04 PROCEDURE — 99214 OFFICE O/P EST MOD 30 MIN: CPT | Performed by: INTERNAL MEDICINE

## 2018-09-04 PROCEDURE — 4040F PNEUMOC VAC/ADMIN/RCVD: CPT | Performed by: INTERNAL MEDICINE

## 2018-09-04 PROCEDURE — 93000 ELECTROCARDIOGRAM COMPLETE: CPT | Performed by: INTERNAL MEDICINE

## 2018-09-04 PROCEDURE — 1123F ACP DISCUSS/DSCN MKR DOCD: CPT | Performed by: INTERNAL MEDICINE

## 2018-09-04 PROCEDURE — 1090F PRES/ABSN URINE INCON ASSESS: CPT | Performed by: INTERNAL MEDICINE

## 2018-09-04 PROCEDURE — 3014F SCREEN MAMMO DOC REV: CPT | Performed by: INTERNAL MEDICINE

## 2018-09-04 PROCEDURE — 1101F PT FALLS ASSESS-DOCD LE1/YR: CPT | Performed by: INTERNAL MEDICINE

## 2018-09-04 PROCEDURE — 1036F TOBACCO NON-USER: CPT | Performed by: INTERNAL MEDICINE

## 2018-09-04 ASSESSMENT — ENCOUNTER SYMPTOMS
NAUSEA: 0
ABDOMINAL DISTENTION: 0
CONSTIPATION: 0
VOMITING: 0
DIARRHEA: 0
WHEEZING: 0
CHEST TIGHTNESS: 1
SHORTNESS OF BREATH: 0

## 2018-09-04 NOTE — PROGRESS NOTES
tablet by mouth daily 30 tablet 3    Estradiol-Norgestimate (PREFEST) 1/1-0.09 MG (15/15) TABS Take 0.5 tablets by mouth daily 60 tablet 3    nystatin (MYCOSTATIN) 370104 UNIT/GM powder Apply topically 4 times daily. 120 g 3    Multiple Vitamins-Minerals (THERAPEUTIC MULTIVITAMIN-MINERALS) tablet Take 1 tablet by mouth daily.  b complex vitamins capsule Take 1 capsule by mouth daily. No current facility-administered medications for this visit. Allergies   Allergen Reactions    Procaine Hcl Palpitations       Cardiographics  ECG: sinus  RBBB  compared to tracing 4/13/2017 no acute change     Review of Systems   Constitutional: Negative for unexpected weight change. Respiratory: Positive for chest tightness (chronic for years  has had myoview, CT chest and upper endo ). Negative for shortness of breath and wheezing. Cardiovascular: Negative for chest pain, palpitations and leg swelling. Gastrointestinal: Negative for abdominal distention, constipation, diarrhea, nausea and vomiting. Musculoskeletal: Positive for arthralgias (due to the knee and hip pain ). Neurological: Negative for dizziness, weakness and light-headedness. Objective:   Physical Exam   Constitutional: She is oriented to person, place, and time. Cardiovascular: Normal rate, regular rhythm, normal heart sounds and intact distal pulses. Exam reveals no gallop and no friction rub. No murmur heard. Pulmonary/Chest: Effort normal and breath sounds normal. No respiratory distress. She has no wheezes. She has no rales. Abdominal: Soft. Bowel sounds are normal. She exhibits no distension. There is no tenderness. There is no guarding. Musculoskeletal: She exhibits no edema. Neurological: She is alert and oriented to person, place, and time. Skin:   Has mild candida intertrigo under breast  This is improved   She has some small spider vein on lower leg         Assessment:          Diagnosis Orders   1.  Primary osteoarthritis of right hip     2. Elevated cholesterol  EKG 12 Lead   3. Chronic pain of left knee  SEDIMENTATION RATE    RHEUMATOID FACTOR    Cyclic Citrul Peptide Antibody, IgG   4. Effusion of left knee  SEDIMENTATION RATE    RHEUMATOID FACTOR    Cyclic Citrul Peptide Antibody, IgG   5. Spider veins     6. RBBB             Plan:       discussed with patient. Medically she is cleared for surgery. My concern is that currently her left knee pain is worse than the pain she is having with her right hip. I would suggest that she discuss with ortho. She may benefit from MRI knee as this did occur with a mechanical injury. If this was inflammatory ( pseudogout) ? ?  Why no response to injected and oral steroid        Her RBBB is old  Has had negative myoview in past             Santos Looney MD

## 2018-09-06 ENCOUNTER — TELEPHONE (OUTPATIENT)
Dept: INTERNAL MEDICINE CLINIC | Age: 73
End: 2018-09-06

## 2018-09-06 LAB — CCP IGG ANTIBODIES: 3 UNITS (ref 0–19)

## 2018-09-06 NOTE — TELEPHONE ENCOUNTER
PT. CALLED  TO LET DR. Selvin Castro DR. [address] THAT AN MRI  WOULD NOT SHOW ANYTHING OTHER THEN A MENISCUS TEAR IN HER LEFT KNEE, AND HE WOULDN'T DO ANYTHING WITH THIS ANYWAY.  WILL GIVE PT. A CORTISONE SHOT IN LEFT KNEE DURING RIGHT HIP REPLACEMENT SURGERY. PT. WANT TO LET DR. Clara Gil.

## 2018-11-19 ENCOUNTER — OFFICE VISIT (OUTPATIENT)
Dept: ORTHOPEDIC SURGERY | Age: 73
End: 2018-11-19
Payer: MEDICARE

## 2018-11-19 ENCOUNTER — TELEPHONE (OUTPATIENT)
Dept: ORTHOPEDIC SURGERY | Age: 73
End: 2018-11-19

## 2018-11-19 VITALS
HEIGHT: 64 IN | SYSTOLIC BLOOD PRESSURE: 128 MMHG | WEIGHT: 135 LBS | HEART RATE: 96 BPM | BODY MASS INDEX: 23.05 KG/M2 | DIASTOLIC BLOOD PRESSURE: 78 MMHG

## 2018-11-19 DIAGNOSIS — M25.562 LEFT KNEE PAIN, UNSPECIFIED CHRONICITY: Primary | ICD-10-CM

## 2018-11-19 DIAGNOSIS — M17.12 PRIMARY OSTEOARTHRITIS OF LEFT KNEE: ICD-10-CM

## 2018-11-19 PROCEDURE — 3017F COLORECTAL CA SCREEN DOC REV: CPT | Performed by: ORTHOPAEDIC SURGERY

## 2018-11-19 PROCEDURE — 1036F TOBACCO NON-USER: CPT | Performed by: ORTHOPAEDIC SURGERY

## 2018-11-19 PROCEDURE — 4040F PNEUMOC VAC/ADMIN/RCVD: CPT | Performed by: ORTHOPAEDIC SURGERY

## 2018-11-19 PROCEDURE — 1123F ACP DISCUSS/DSCN MKR DOCD: CPT | Performed by: ORTHOPAEDIC SURGERY

## 2018-11-19 PROCEDURE — 3014F SCREEN MAMMO DOC REV: CPT | Performed by: ORTHOPAEDIC SURGERY

## 2018-11-19 PROCEDURE — G8399 PT W/DXA RESULTS DOCUMENT: HCPCS | Performed by: ORTHOPAEDIC SURGERY

## 2018-11-19 PROCEDURE — G8482 FLU IMMUNIZE ORDER/ADMIN: HCPCS | Performed by: ORTHOPAEDIC SURGERY

## 2018-11-19 PROCEDURE — G8427 DOCREV CUR MEDS BY ELIG CLIN: HCPCS | Performed by: ORTHOPAEDIC SURGERY

## 2018-11-19 PROCEDURE — 1101F PT FALLS ASSESS-DOCD LE1/YR: CPT | Performed by: ORTHOPAEDIC SURGERY

## 2018-11-19 PROCEDURE — 20610 DRAIN/INJ JOINT/BURSA W/O US: CPT | Performed by: ORTHOPAEDIC SURGERY

## 2018-11-19 PROCEDURE — 1090F PRES/ABSN URINE INCON ASSESS: CPT | Performed by: ORTHOPAEDIC SURGERY

## 2018-11-19 PROCEDURE — G8420 CALC BMI NORM PARAMETERS: HCPCS | Performed by: ORTHOPAEDIC SURGERY

## 2018-11-19 PROCEDURE — 99203 OFFICE O/P NEW LOW 30 MIN: CPT | Performed by: ORTHOPAEDIC SURGERY

## 2018-11-19 RX ORDER — IBUPROFEN 200 MG
200 TABLET ORAL EVERY 6 HOURS PRN
COMMUNITY

## 2018-11-19 NOTE — PROGRESS NOTES
Vital Signs:  /78   Pulse 96   Ht 5' 4\" (1.626 m)   Wt 135 lb (61.2 kg)   BMI 23.17 kg/m²     General Exam:   Constitutional: Patient is adequately groomed with no evidence of malnutrition  Mental Status: The patient is oriented to time, place and person. The patient's mood and affect are appropriate. Lymphatic: The lymphatic examination bilaterally reveals all areas to be without enlargement or induration. Knee Examination:    Inspection:  No major deformity left knee    Palpation:  Grade 3 patellofemoral crepitus with significant positive compression grind but negative apprehension sign. Significant retropatellar and peripatellar tenderness medial and lateral facet. Negative Lachman, drawer, pivot shift and posterior drawer. Good medial lateral stability at 0 and 30°. There is mild varus deformity. Carmelo sign did not produce any pain or sharp catching medial or lateral but she does have lateral joint line tenderness which when tested with Carmelo sign just produced medial soreness. Range of Motion:  0-120° left knee    Strength:  Quadriceps strength 3/5 especially VMO left    Special Tests:  Negative Homans sign left    Skin: There are no rashes, ulcerations or lesions. Gait: Mild antalgic gait left    Reflex intact    Additional Comments:       Additional Examinations:         Contralateral Exam: Examination of the contralateral knee reveals warm skin, range of motion within normal limits, good quadriceps bulk, tone and strength, no tenderness to palpation, stable cruciate and collateral ligaments, and no joint line tenderness. Left Lower Extremity: Examination of the left lower extremity does not show any tenderness, deformity or injury. Range of motion is unremarkable. There is no gross instability. There are no rashes, ulcerations or lesions.   Strength and tone are normal.    Radiology:     X-rays obtained and reviewed in office:  Views standing AP, PA, lateral and sunrise  Location left knee  Impression she is close to bone-on-bone medial compartment and patellofemoral compartments with primary osteoarthritis left knee. I cannot rule out other internal derangement. Assessment :  Advanced close to bone-on-bone medial and patellofemoral compartments left knee. I cannot rule out internal derangement. Impression:  Encounter Diagnoses   Name Primary?  Left knee pain, unspecified chronicity Yes    Primary osteoarthritis of left knee        Office Procedures:  Orders Placed This Encounter   Procedures    XR KNEE LEFT (MIN 4 VIEWS)    NJ ARTHROCENTESIS ASPIR&/INJ MAJOR JT/BURSA W/O US    ORTHOVISC INJ PER DOSE    ORTHOVISC INJ PER DOSE     LEFT KNEE     Standing Status:   Future     Standing Expiration Date:   11/19/2019       Treatment Plan:  Because of her improvement with cortisone for short periods of time and her aggravation by obviously damaging activities such as prolonged walking and deep knee bending we talked about the options of conservative versus aggressive care. I think at this point any arthroscopy which would involve medial or patellofemoral compartment which a sleeve shirt quickly to total knee arthroplasty and her lateral tenderness did not have a Carmelo sign. We talked about taking anti-inflammatory medications that she's can restart her meloxicam medication daily with obvious precautions. She is going to use ice on regular basis. We talked about safe zone exercising and the need for strengthening but in a prescribed short arc basis. We also recommended viscous supplementation and she will start with 4th of Visco today in her left knee. She understands risks and accepts that. The patient returns today for their first Orthovisc for diagnosis of osteoarthritis . The risks, benefits, and complications of the injections were again discussed in detail with the patient.  The risks discussed included but are not limited to infection, skin

## 2018-11-21 ENCOUNTER — TELEPHONE (OUTPATIENT)
Dept: ORTHOPEDIC SURGERY | Age: 73
End: 2018-11-21

## 2018-11-21 NOTE — TELEPHONE ENCOUNTER
11/21/18  [facility]   - APPROVED #[id number]   11/19/18 TO 12/31/18 - PER TIMOTEO @ [facility] - NO DEDUCTIBLE - 80/20 - $5900 OOP/ $8216 MET - AUTH REQUIRED THRU CLINICAL INTAKE TEAM @[phone] - CARI MIRNA - [phone] - NDS

## 2018-11-26 ENCOUNTER — HOSPITAL ENCOUNTER (OUTPATIENT)
Dept: PHYSICAL THERAPY | Age: 73
Setting detail: THERAPIES SERIES
Discharge: HOME | End: 2018-11-26
Payer: MEDICARE

## 2018-11-26 ENCOUNTER — OFFICE VISIT (OUTPATIENT)
Dept: ORTHOPEDIC SURGERY | Age: 73
End: 2018-11-26
Payer: MEDICARE

## 2018-11-26 VITALS — WEIGHT: 134.92 LBS | HEIGHT: 64 IN | BODY MASS INDEX: 23.03 KG/M2

## 2018-11-26 DIAGNOSIS — M17.12 PRIMARY OSTEOARTHRITIS OF LEFT KNEE: Primary | Chronic | ICD-10-CM

## 2018-11-26 PROCEDURE — 20610 DRAIN/INJ JOINT/BURSA W/O US: CPT | Performed by: ORTHOPAEDIC SURGERY

## 2018-11-26 PROCEDURE — 97161 PT EVAL LOW COMPLEX 20 MIN: CPT

## 2018-11-26 PROCEDURE — 97112 NEUROMUSCULAR REEDUCATION: CPT

## 2018-11-26 PROCEDURE — 97110 THERAPEUTIC EXERCISES: CPT

## 2018-11-26 PROCEDURE — G8978 MOBILITY CURRENT STATUS: HCPCS

## 2018-11-26 PROCEDURE — G8979 MOBILITY GOAL STATUS: HCPCS

## 2018-11-26 NOTE — PLAN OF CARE
[facility], [address]  Phone: [phone]  Fax [phone]     Physical Therapy Certification    Dear Referring Practitioner: Dr. Kathleen Al,    We had the pleasure of evaluating the following patient for physical therapy services at [address] [place] [address]. A summary of our findings can be found in the initial assessment below. This includes our plan of care. If you have any questions or concerns regarding these findings, please do not hesitate to contact me at the office phone number checked above. Thank you for the referral.       Physician Signature:_______________________________Date:__________________  By signing above (or electronic signature), therapists plan is approved by physician    Patient: Adolfo Canavan   : 1945   MRN: [id number]  Referring Physician: Referring Practitioner: Dr. Kathleen Al      Evaluation Date: 2018      Medical Diagnosis Information:  Diagnosis: M25.562 Left Knee Pain,  M17.12 Left Knee OA   Treatment Diagnosis: M25.562 Left Knee Pain,  M25.662 Left Knee Stiffness,  R26.2 Diffiuclty Walking                                         Insurance information: PT Insurance Information: [facility]     Precautions/ Contra-indications: Right Lateral THR (10-3-2018, Dr. Kirstie Hutchins), HLD, Osteopenia  Latex Allergy:  [x]NO      []YES  Preferred Language for Healthcare:   [x]English       []other:    SUBJECTIVE: Patient reports injuring her left knee 4.5 months ago (2018) coming down a step with her grandchild in tow. Was focused on the grand child and does now remember any specific injury, but had immediate sharp anterior-medial joint pain and difficulty standing. To see [facility] orthopedist who gave her a cortisone injection which significantly reduced her left knee pain so that she could go on her beach vacation.   Pain and stiffness in her left knee gradually structures and functions (impairments), activity limitations, and/or participation restrictions;:  [x] a total of 1-2 or more elements   [] a total of 3 or more elements   [] a total of 4 or more elements   [x] A clinical presentation with:  [x] stable and/or uncomplicated characteristics   [] evolving clinical presentation with changing characteristics  [] unstable and unpredictable characteristics;   [x] Clinical decision making of [x] low, [] moderate, [] high complexity using standardized patient assessment instrument and/or measurable assessment of functional outcome. [x] EVAL (LOW) 56045 (typically 20 minutes face-to-face)  [] EVAL (MOD) 23462 (typically 30 minutes face-to-face)  [] EVAL (HIGH) 42752 (typically 45 minutes face-to-face)  [] RE-EVAL       PLAN:   Frequency/Duration:  2 days per week for 6 Weeks:  Interventions:  [x]  Therapeutic exercise including: strength training, ROM, for Lower extremity and core   [x]  NMR activation and proprioception for LE, Glutes and Core   [x]  Manual therapy as indicated for LE, Hip and spine to include: Dry Needling/IASTM, STM, PROM, Gr I-IV mobilizations, manipulation. [x] Modalities as needed that may include: thermal agents, E-stim, Biofeedback, US, iontophoresis as indicated  [x] Patient education on joint protection, postural re-education, activity modification, progression of HEP. HEP instruction: Pt given written HEP (see scanned forms). GOALS:  Patient stated goal: Walk community distances without AD and no increase in symptoms or restrictions. Therapist goals for Patient:   Short Term Goals: To be achieved in: 2 weeks  1. Independent in HEP and progression per patient tolerance, in order to prevent re-injury. 2. Patient will have a decrease in pain to facilitate improvement in movement, function, and ADLs as indicated by Functional Deficits. Long Term Goals: To be achieved in: 6 weeks  1.  Disability index score of 25% or less for the LEFS to assist with reaching prior level of function. 2. Patient will demonstrate increased AROM Left Knee to 0-128 to allow for proper joint functioning as indicated by patients Functional Deficits. 3. Patient will demonstrate an increase in Strength to good proximal hip strength and control, within 5lb HHD in LE to allow for proper functional mobility as indicated by patients Functional Deficits. 4. Patient will ascend and descend a flight of stairs with reciprocal gait without increased symptoms or restriction. 5.Walk community distances without AD and no increase in symptoms or restrictions.        Electronically signed by:  Jack Oppenheim, [address]

## 2018-11-26 NOTE — FLOWSHEET NOTE
[facility], [address] [place] [address] [place]  Phone: [phone]  Fax [phone]    Physical Therapy Daily Treatment Note  Date:  2018    Patient Name:  Kate Lutz    :  1945  MRN: [id number]  Restrictions/Precautions: Right Lateral THR 10-3-2018,  Currently othovisc injections to right knee   Medical/Treatment Diagnosis Information:  Diagnosis: M25.562 Left Knee Pain,  M17.12 Left Knee OA  Treatment Diagnosis: M25.562 Left Knee Pain,  M25.662 Left Knee Stiffness,  R26.2 Diffiuclty Walking  Insurance/Certification information:  PT Insurance Information: [facility]  Physician Information:  Referring Practitioner: Dr. Dakota GO [facility] signed (Y/N): due 2018    Date of Patient follow up with Physician:     G-Code (if applicable):      Date G-Code Applied:    PT G-Codes  Functional Assessment Tool Used: LEFS  Score: 40%  Functional Limitation: Mobility: Walking and moving around  Mobility: Walking and Moving Around Current Status (): At least 40 percent but less than 60 percent impaired, limited or restricted  Mobility: Walking and Moving Around Goal Status ():  At least 20 percent but less than 40 percent impaired, limited or restricted    Progress Note: [x]  Yes  []  No  Next due by: Visit #10       Latex Allergy:  [x]NO      []YES  Preferred Language for Healthcare:   [x]English       []other:    Visit # Insurance Allowable Requires auth   1 BMN  2xwk for 6 weeks    [x]no        []yes:       Pain level:  1-9/10 Left knee    SUBJECTIVE:  See eval    OBJECTIVE: See eval  Observation:   Test measurements:      RESTRICTIONS/PRECAUTIONS: Right Lateral THR 10-3-2018,  Currently othovisc injections to right knee     Exercises/Interventions:     Therapeutic Ex Sets/sec Reps Notes   Sidestepping (20 steps right/20 steps left) YTB @ ankles  hep   Supine Psoas Release Stretch w/ knee Flex Stretch H30x3  hep SAQ w/ ankle Ball Squeeze H5 2x10  hep                                                   Manual Intervention                                          NMR re-education      DX Review. PPP Precautions, and Knee Care Instructions using knee anataomy illustrations and model 15 min           GT Neutral gait/ heel strike/toe-off 6 min                           Therapeutic Exercise and NMR EXR  [x] (47308) Provided verbal/tactile cueing for activities related to strengthening, flexibility, endurance, ROM for improvements in LE, proximal hip, and core control with self care, mobility, lifting, ambulation.  [] (97399) Provided verbal/tactile cueing for activities related to improving balance, coordination, kinesthetic sense, posture, motor skill, proprioception  to assist with LE, proximal hip, and core control in self care, mobility, lifting, ambulation and eccentric single leg control.      NMR and Therapeutic Activities:    [] (12133 or 90474) Provided verbal/tactile cueing for activities related to improving balance, coordination, kinesthetic sense, posture, motor skill, proprioception and motor activation to allow for proper function of core, proximal hip and LE with self care and ADLs  [x] (31042) Gait Re-education- Provided training and instruction to the patient for proper LE, core and proximal hip recruitment and positioning and eccentric body weight control with ambulation re-education including up and down stairs     Home Exercise Program:    [x] (72339) Reviewed/Progressed HEP activities related to strengthening, flexibility, endurance, ROM of core, proximal hip and LE for functional self-care, mobility, lifting and ambulation/stair navigation   [] (68899)Reviewed/Progressed HEP activities related to improving balance, coordination, kinesthetic sense, posture, motor skill, proprioception of core, proximal hip and LE for self care, mobility, lifting, and ambulation/stair navigation      Manual Treatments:  PROM / Progression Towards Functional goals:  [] Patient is progressing as expected towards functional goals listed. [] Progression is slowed due to complexities listed. [] Progression has been slowed due to co-morbidities.   [x] Plan just implemented, too soon to assess goals progression  [] Other:     ASSESSMENT:  See eval    Treatment/Activity Tolerance:  [x] Patient tolerated treatment well [] Patient limited by fatique  [] Patient limited by pain  [] Patient limited by other medical complications  [] Other:     Prognosis: [x] Good [x] Fair  [] Poor    Patient Requires Follow-up: [x] Yes  [] No    PLAN: See eval  [] Continue per plan of care [] Alter current plan (see comments)  [x] Plan of care initiated [] Hold pending MD visit [] Discharge    Electronically signed by: Collette Lamas, [place]

## 2018-11-27 ENCOUNTER — TELEPHONE (OUTPATIENT)
Dept: ORTHOPEDIC SURGERY | Age: 73
End: 2018-11-27

## 2018-11-27 DIAGNOSIS — M17.12 PRIMARY OSTEOARTHRITIS OF LEFT KNEE: Primary | Chronic | ICD-10-CM

## 2018-11-29 ENCOUNTER — APPOINTMENT (OUTPATIENT)
Dept: PHYSICAL THERAPY | Age: 73
End: 2018-11-29
Payer: MEDICARE

## 2018-12-07 ENCOUNTER — OFFICE VISIT (OUTPATIENT)
Dept: ORTHOPEDIC SURGERY | Age: 73
End: 2018-12-07
Payer: MEDICARE

## 2018-12-07 DIAGNOSIS — M17.12 PRIMARY OSTEOARTHRITIS OF LEFT KNEE: Primary | Chronic | ICD-10-CM

## 2018-12-07 PROCEDURE — 20610 DRAIN/INJ JOINT/BURSA W/O US: CPT | Performed by: ORTHOPAEDIC SURGERY

## 2018-12-07 NOTE — PROGRESS NOTES
The patient returns today for their third Orthovisc for diagnosis of osteoarthritis. The risks, benefits, and complications of the injections were again discussed in detail with the patient. The risks discussed included but are not limited to infection, skin reactions, hot swollen joints, and anaphylaxis. The patient gave verbal informed consent for the injection. The patient skin was prepped with iodine and sterile 4x4 gauze pad and the left knee joint was injected with 2 ml of Orthovisc intra-articularly under sterile conditions  into the supra-lateral pouch. The patient tolerated the injection reasonably well. The patient was given instructions to ice the left knee and avoid strenuous activities for 24-48 hours. The patient was instructed to call the office immediately if there is increased pain, redness, warmth, fever, or chills. We will see the patient back in 3-months for a follow-up.

## 2018-12-11 ENCOUNTER — TELEPHONE (OUTPATIENT)
Dept: PRIMARY CARE CLINIC | Age: 73
End: 2018-12-11

## 2018-12-13 ENCOUNTER — TELEPHONE (OUTPATIENT)
Dept: ORTHOPEDIC SURGERY | Age: 73
End: 2018-12-13

## 2019-03-07 ENCOUNTER — PROCEDURE VISIT (OUTPATIENT)
Dept: PRIMARY CARE CLINIC | Age: 74
End: 2019-03-07
Payer: MEDICARE

## 2019-03-07 VITALS
TEMPERATURE: 97.4 F | SYSTOLIC BLOOD PRESSURE: 104 MMHG | BODY MASS INDEX: 23.97 KG/M2 | DIASTOLIC BLOOD PRESSURE: 58 MMHG | HEIGHT: 64 IN | WEIGHT: 140.41 LBS | HEART RATE: 92 BPM

## 2019-03-07 DIAGNOSIS — Z01.818 PRE-OP EXAMINATION: Primary | ICD-10-CM

## 2019-03-07 PROCEDURE — 3017F COLORECTAL CA SCREEN DOC REV: CPT | Performed by: INTERNAL MEDICINE

## 2019-03-07 PROCEDURE — 1090F PRES/ABSN URINE INCON ASSESS: CPT | Performed by: INTERNAL MEDICINE

## 2019-03-07 PROCEDURE — G8420 CALC BMI NORM PARAMETERS: HCPCS | Performed by: INTERNAL MEDICINE

## 2019-03-07 PROCEDURE — 99213 OFFICE O/P EST LOW 20 MIN: CPT | Performed by: INTERNAL MEDICINE

## 2019-03-07 PROCEDURE — 93000 ELECTROCARDIOGRAM COMPLETE: CPT | Performed by: INTERNAL MEDICINE

## 2019-03-07 PROCEDURE — 1101F PT FALLS ASSESS-DOCD LE1/YR: CPT | Performed by: INTERNAL MEDICINE

## 2019-03-07 PROCEDURE — G8427 DOCREV CUR MEDS BY ELIG CLIN: HCPCS | Performed by: INTERNAL MEDICINE

## 2019-03-07 PROCEDURE — G8482 FLU IMMUNIZE ORDER/ADMIN: HCPCS | Performed by: INTERNAL MEDICINE

## 2019-03-07 RX ORDER — ATORVASTATIN CALCIUM 10 MG/1
TABLET, FILM COATED ORAL
Qty: 90 TABLET | Refills: 3 | Status: SHIPPED | OUTPATIENT
Start: 2019-03-07

## 2019-05-16 ENCOUNTER — OFFICE VISIT (OUTPATIENT)
Dept: PRIMARY CARE CLINIC | Age: 74
End: 2019-05-16
Payer: MEDICARE

## 2019-05-16 VITALS
TEMPERATURE: 98 F | SYSTOLIC BLOOD PRESSURE: 104 MMHG | DIASTOLIC BLOOD PRESSURE: 76 MMHG | WEIGHT: 136.2 LBS | HEART RATE: 88 BPM | HEIGHT: 63 IN | BODY MASS INDEX: 24.13 KG/M2

## 2019-05-16 DIAGNOSIS — R23.2 HOT FLASHES: Primary | ICD-10-CM

## 2019-05-16 PROCEDURE — G8427 DOCREV CUR MEDS BY ELIG CLIN: HCPCS | Performed by: INTERNAL MEDICINE

## 2019-05-16 PROCEDURE — 99214 OFFICE O/P EST MOD 30 MIN: CPT | Performed by: INTERNAL MEDICINE

## 2019-05-16 PROCEDURE — 3017F COLORECTAL CA SCREEN DOC REV: CPT | Performed by: INTERNAL MEDICINE

## 2019-05-16 PROCEDURE — 1036F TOBACCO NON-USER: CPT | Performed by: INTERNAL MEDICINE

## 2019-05-16 PROCEDURE — 1090F PRES/ABSN URINE INCON ASSESS: CPT | Performed by: INTERNAL MEDICINE

## 2019-05-16 PROCEDURE — G8399 PT W/DXA RESULTS DOCUMENT: HCPCS | Performed by: INTERNAL MEDICINE

## 2019-05-16 PROCEDURE — G8420 CALC BMI NORM PARAMETERS: HCPCS | Performed by: INTERNAL MEDICINE

## 2019-05-16 PROCEDURE — 4040F PNEUMOC VAC/ADMIN/RCVD: CPT | Performed by: INTERNAL MEDICINE

## 2019-05-16 PROCEDURE — 1123F ACP DISCUSS/DSCN MKR DOCD: CPT | Performed by: INTERNAL MEDICINE

## 2019-05-16 RX ORDER — CITALOPRAM 10 MG/1
10 TABLET ORAL DAILY
Qty: 30 TABLET | Refills: 3 | Status: SHIPPED | OUTPATIENT
Start: 2019-05-16

## 2019-05-16 ASSESSMENT — PATIENT HEALTH QUESTIONNAIRE - PHQ9
SUM OF ALL RESPONSES TO PHQ QUESTIONS 1-9: 0
1. LITTLE INTEREST OR PLEASURE IN DOING THINGS: 0
2. FEELING DOWN, DEPRESSED OR HOPELESS: 0
SUM OF ALL RESPONSES TO PHQ9 QUESTIONS 1 & 2: 0
SUM OF ALL RESPONSES TO PHQ QUESTIONS 1-9: 0

## 2019-05-16 NOTE — PROGRESS NOTES
Anu Newman  YOB: 1945    Date of Service:  5/16/2019    Chief Complaint:   Anu Newman is a 68 y.o. female who presents for hot flashes. HPI: patient came here for hot flashes after she stop hormonal therapy due to surgery. Patient want to restart medication. She is 68years old. Review of Systems:  Constitutional: Negative for chills, fever, malaise/fatigue and weight loss. HENT: Negative for headaches, ear discharge, ear pain, hearing loss, sore throat, blurry vision and double vision. Respiratory: Negative for cough,  sputum production, hemoptysis, shortness of breath and wheezing. Cardiovascular: Negative for chest pain, palpitations, orthopnea, claudication and leg swelling. Gastrointestinal: Negative for abdominal pain, nausea and vomiting, constipation, diarrhea, heartburn, blood in stool, melena. Genitourinary: Negative for dysuria, flank pain, frequency, hematuria and urgency. Hot flashes+    Musculoskeletal: Negative for back pain, myalgias and neck pain. Knee pain. Shoulder pain. Neurological: Negative for dizziness, seizure, sensory change, focal weakness, loss of consciousness . Psychiatric/Behavioral: Negative for depression and substance abuse. The patient does not have insomnia. Skin: Rash, skin lesion, itching, acne, numerous moles. Vitals:    05/16/19 1311   BP: 104/76   Pulse: 88   Temp: 98 °F (36.7 °C)     Wt Readings from Last 3 Encounters:   05/16/19 136 lb 3.2 oz (61.8 kg)   03/07/19 140 lb 6.6 oz (63.7 kg)   11/26/18 134 lb 14.7 oz (61.2 kg)     BP Readings from Last 3 Encounters:   05/16/19 104/76   03/07/19 (!) 104/58   11/19/18 128/78         Physical Exam:  Constitutional:   appears well-nourished. No distress. HENT:   Head: Normocephalic. Right Ear: External ear normal.   Left Ear: External ear normal.   Mouth/Throat: Oropharynx is clear and moist. No oropharyngeal exudate.      Eyes: Conjunctivae and EOM are normal. Pupils are equal, round, and reactive to light. Right eye exhibits no discharge. Left eye exhibits no discharge. No scleral icterus. Neck: Neck supple. No JVD present. No thyromegaly present. Cardiovascular:  Normal rate, regular rhythm, normal heart sounds and intact distal pulses. Exam reveals no gallop. No murmur heard. no edema. Chest: Effort normal and breath sounds normal. no wheezes. has no rales. Abdominal: Soft, Bowel sounds are normal.  exhibits no distension and no mass. No organomegaly  There is no tenderness. There is no guarding. Musculoskeletal: Normal range of motion in all extremities. Lymphadenopathy: No cervical adenopathy. Neurological:  exhibits normal muscle tone. No sensory or motor deficit, Coordination normal, normal reflexes. Skin: Skin is warm. No rash . No Erythema. Psychiatric:  alert and oriented to person, place, and time. Normal mood and affect.            Immunization History   Administered Date(s) Administered    Hepatitis A 01/23/2017    Hepatitis A Adult (Vaqta) 07/25/2017    Hepatitis B (Engerix-B) 01/31/2017, 03/01/2017    Hepatitis B Adult (Recombivax HB) 07/25/2017    Influenza Vaccine, unspecified formulation 10/08/2009, 12/17/2009, 11/19/2010, 09/28/2012, 01/01/2014, 10/02/2015, 10/12/2016, 08/31/2017, 09/11/2018    Influenza Virus Vaccine 11/19/2010, 09/28/2012    Influenza, High Dose (Fluzone 65 yrs and older) 10/02/2015, 08/31/2017, 09/11/2018    Pneumococcal 13-valent Conjugate ([id number]) 11/17/2016    Pneumococcal Polysaccharide ([id number]) 11/03/2011    Tdap (Boostrix, Adacel) 11/19/2010    Zoster Subunit (Shingrix) 02/14/2019       Allergies   Allergen Reactions    Procaine Hcl Palpitations     Current Outpatient Medications   Medication Sig Dispense Refill    citalopram (CELEXA) 10 MG tablet Take 1 tablet by mouth daily 30 tablet 3    atorvastatin (LIPITOR) 10 MG tablet TAKE 1 TABLET EVERY DAY 90 Tobacco Use    Smoking status: Former Smoker     Last attempt to quit: 2000     Years since quittin.3    Smokeless tobacco: Never Used   Substance and Sexual Activity    Alcohol use: Yes     Comment: 1-2    Drug use: No    Sexual activity: Yes   Lifestyle    Physical activity:     Days per week: Not on file     Minutes per session: Not on file    Stress: Not on file   Relationships    Social connections:     Talks on phone: Not on file     Gets together: Not on file     Attends Jain service: Not on file     Active member of club or organization: Not on file     Attends meetings of clubs or organizations: Not on file     Relationship status: Not on file    Intimate partner violence:     Fear of current or ex partner: Not on file     Emotionally abused: Not on file     Physically abused: Not on file     Forced sexual activity: Not on file   Other Topics Concern    Not on file   Social History Narrative    Not on file       Assessment/Plan:    1. Hot flashes  As patient s' age it is not good option to restart hormonal therapy . Discussed side effects more then 25 minutes. Try Celexa.

## 2019-05-17 RX ORDER — CIPROFLOXACIN 500 MG/1
500 TABLET, FILM COATED ORAL 2 TIMES DAILY
Qty: 10 TABLET | Refills: 0 | Status: SHIPPED | OUTPATIENT
Start: 2019-05-17 | End: 2019-05-22

## 2019-06-10 DIAGNOSIS — Z78.0 MENOPAUSE: ICD-10-CM

## 2019-06-10 DIAGNOSIS — R23.2 HOT FLASHES: Primary | ICD-10-CM

## 2020-01-16 ENCOUNTER — TELEPHONE (OUTPATIENT)
Dept: ORTHOPEDIC SURGERY | Age: 75
End: 2020-01-16

## 2020-01-21 ENCOUNTER — TELEPHONE (OUTPATIENT)
Dept: ORTHOPEDIC SURGERY | Age: 75
End: 2020-01-21

## 2020-01-21 NOTE — TELEPHONE ENCOUNTER
1/21/2020 59 [facility] () series of 1 RIGHT. AUTH # NO AUTH REQUIRED. BUY AND BILL. Per [facility] MEDICARE GUIDELINES Rep [id number]. REF # [id number].

## 2020-01-22 ENCOUNTER — OFFICE VISIT (OUTPATIENT)
Dept: ORTHOPEDIC SURGERY | Age: 75
End: 2020-01-22
Payer: MEDICARE

## 2020-01-22 VITALS — HEIGHT: 64 IN | WEIGHT: 146 LBS | BODY MASS INDEX: 24.92 KG/M2

## 2020-01-22 PROCEDURE — G9899 SCRN MAM PERF RSLTS DOC: HCPCS | Performed by: ORTHOPAEDIC SURGERY

## 2020-01-22 PROCEDURE — G8399 PT W/DXA RESULTS DOCUMENT: HCPCS | Performed by: ORTHOPAEDIC SURGERY

## 2020-01-22 PROCEDURE — 4040F PNEUMOC VAC/ADMIN/RCVD: CPT | Performed by: ORTHOPAEDIC SURGERY

## 2020-01-22 PROCEDURE — G8417 CALC BMI ABV UP PARAM F/U: HCPCS | Performed by: ORTHOPAEDIC SURGERY

## 2020-01-22 PROCEDURE — 1036F TOBACCO NON-USER: CPT | Performed by: ORTHOPAEDIC SURGERY

## 2020-01-22 PROCEDURE — 99213 OFFICE O/P EST LOW 20 MIN: CPT | Performed by: ORTHOPAEDIC SURGERY

## 2020-01-22 PROCEDURE — 1090F PRES/ABSN URINE INCON ASSESS: CPT | Performed by: ORTHOPAEDIC SURGERY

## 2020-01-22 PROCEDURE — 1123F ACP DISCUSS/DSCN MKR DOCD: CPT | Performed by: ORTHOPAEDIC SURGERY

## 2020-01-22 PROCEDURE — 3017F COLORECTAL CA SCREEN DOC REV: CPT | Performed by: ORTHOPAEDIC SURGERY

## 2020-01-22 PROCEDURE — 20610 DRAIN/INJ JOINT/BURSA W/O US: CPT | Performed by: ORTHOPAEDIC SURGERY

## 2020-01-22 PROCEDURE — G8427 DOCREV CUR MEDS BY ELIG CLIN: HCPCS | Performed by: ORTHOPAEDIC SURGERY

## 2020-01-22 PROCEDURE — G8484 FLU IMMUNIZE NO ADMIN: HCPCS | Performed by: ORTHOPAEDIC SURGERY

## 2020-01-22 NOTE — PROGRESS NOTES
stability with negative Lockman, drawer, pivot shift and posterior drawer. Good medial lateral stability at 0 and 30 degrees. She has significant peripatellar and retropatellar tenderness medial and lateral facet with positive compression grind but negative apprehension sign. Minimal to no significant medial or lateral joint line tenderness. Carmelo sign negative. Range of Motion: 0 to 130 degrees right    Strength: Quadricep strength 3+/5 right    Special Tests: Negative Homans sign right    Skin: There are no rashes, ulcerations or lesions. Gait: Walking without ambulatory aids    Reflex intact    Additional Comments:       Additional Examinations:         Right Lower Extremity: Examination of the right lower extremity does not show any tenderness, deformity or injury. Range of motion is unremarkable. There is no gross instability. There are no rashes, ulcerations or lesions. Strength and tone are normal.  Left Lower Extremity: Examination of the left lower extremity does not show any tenderness, deformity or injury. Range of motion is unremarkable. There is no gross instability. There are no rashes, ulcerations or lesions. Strength and tone are normal.    Radiology:     X-rays obtained and reviewed in office:  Views standing AP, PA, lateral and sunrise  Location right knee  Impression advanced patellofemoral primary osteoarthritis with mild medial compartment narrowing but overall pretty good maintenance of medial lateral compartment         Assessment : Primary osteoarthritis advanced affecting patellofemoral joint. Cannot rule out other internal derangement but does not seem to be the case historically or clinically. Impression:  Encounter Diagnoses   Name Primary?     Right knee pain, unspecified chronicity Yes    Primary osteoarthritis of right knee     Primary osteoarthritis of left knee        Office Procedures:  Orders Placed This Encounter   Procedures    XR KNEE RIGHT (MIN 4 VIEWS)     Standing Status:   Future     Number of Occurrences:   1     Standing Expiration Date:   1/22/2021    WA ARTHROCENTESIS ASPIR&/INJ MAJOR JT/BURSA W/O US       Treatment Plan: At this point we talked about the natural history and options of treatment. She is going on a trip in about another month or so to [facility] and for this reason is already been approved and wants to go ahead with some Visco supplementation. She understands the risks and accepts that. The patient returns today for their first Orthovisc for diagnosis of osteoarthritis . The risks, benefits, and complications of the injections were again discussed in detail with the patient. The risks discussed included but are not limited to infection, skin reactions, hot swollen joints, and anaphylaxis. The patient gave verbal informed consent for the injection. The patient skin was prepped with iodine and sterile 4x4 gauze pad and the right knee joint was injected with 2 ml of Orthovisc  intra-articularly under sterile conditions  into the supra-lateral pouch. The patient tolerated the injection reasonably well. The patient was given instructions to ice the right knee and avoid strenuous activities for 24-48 hours. The patient was instructed to call the office immediately if there is increased pain, redness, warmth, fever, or chills. We will see the patient back in one week for their second injection.

## 2020-01-28 NOTE — PROGRESS NOTES
CC:  FU Knee Osteoarthritis with Viscosupplementation       HPI: Cristian Aviles is a very pleasant 71-year-old white female who is status post left total knee arthroplasty is being seen today for her second Orthovisc injection to her right knee as a courtesy check for Dr. Maira Cerna. She has noticed that her knee is feeling a little bit better. She has been taking 600 mg of ibuprofen only episodically but was able to get out and take a walk this past weekend with only mild soreness the following day which abated reasonably quickly. She is working on her home-based exercise program and denies locking catching or true instability symptoms. Is here today for her second Orthovisc injection. PE: no substantial change in exam.    Assessment:  Knee Osteoarthritis with viscosupplementation      PROCEDURE NOTE:    PRE-PROCEDURE DIAGNOSIS: DJD knee    POST-PROCEDURE DIAGNOSIS: DJD knee    Injection #2 of Orthovisc. PROCEDURE:  With the patient's permission, her right knee was prepped in standard sterile fashion with Betadine and Alcohol and the prefilled 2cc injection of Orthovisc was injected intra-articularly into the right knee via a superolateral approach without difficulty. The patient tolerated this well without difficulty. A band-aid was applied. POST-PROCEDURE INSTRUCTIONS GIVEN TO PATIENT: The patient was advised to ice the knee for 15-20 minutes to relieve any injection site related pain. FOLLOW-UP: as directed. Was encouraged to continue with her exercise program and we did discuss anti-inflammatory medications. She is only taking about 600 mg 1 time daily and may wish to try either Aleve 2 pills twice daily or back to her meloxicam to see if this aids her right knee pain symptoms until her Orthovisc kick in fully. She will be seen back by Dr. Maira Cerna next week to finish up her Orthovisc injection. She will contact us in the interim with questions or concerns.

## 2020-01-29 ENCOUNTER — OFFICE VISIT (OUTPATIENT)
Dept: ORTHOPEDIC SURGERY | Age: 75
End: 2020-01-29
Payer: MEDICARE

## 2020-01-29 VITALS — HEIGHT: 64 IN | BODY MASS INDEX: 24.92 KG/M2 | WEIGHT: 145.94 LBS

## 2020-01-29 PROCEDURE — 20610 DRAIN/INJ JOINT/BURSA W/O US: CPT | Performed by: FAMILY MEDICINE

## 2020-02-07 ENCOUNTER — OFFICE VISIT (OUTPATIENT)
Dept: ORTHOPEDIC SURGERY | Age: 75
End: 2020-02-07
Payer: MEDICARE

## 2020-02-07 PROCEDURE — 20610 DRAIN/INJ JOINT/BURSA W/O US: CPT | Performed by: ORTHOPAEDIC SURGERY

## 2020-02-07 RX ORDER — IBUPROFEN 800 MG/1
800 TABLET ORAL EVERY 6 HOURS PRN
Qty: 90 TABLET | Refills: 3 | Status: SHIPPED | OUTPATIENT
Start: 2020-02-07 | End: 2020-06-12

## 2020-02-07 NOTE — PROGRESS NOTES
The patient returns today for their third Orthovisc for diagnosis of osteoarthritis. The risks, benefits, and complications of the injections were again discussed in detail with the patient. The risks discussed included but are not limited to infection, skin reactions, hot swollen joints, and anaphylaxis. The patient gave verbal informed consent for the injection. The patient skin was prepped with iodine and sterile 4x4 gauze pad and the right knee joint was injected with 2 ml of Orthovisc intra-articularly under sterile conditions  into the supra-lateral pouch. The patient tolerated the injection reasonably well. The patient was given instructions to ice the right knee and avoid strenuous activities for 24-48 hours. The patient was instructed to call the office immediately if there is increased pain, redness, warmth, fever, or chills. We will see the patient back in 3-months for a follow-up.

## 2020-06-12 RX ORDER — IBUPROFEN 800 MG/1
800 TABLET ORAL EVERY 6 HOURS PRN
Qty: 90 TABLET | Refills: 3 | Status: SHIPPED | OUTPATIENT
Start: 2020-06-12

## 2022-05-25 ENCOUNTER — TELEPHONE (OUTPATIENT)
Dept: ORTHOPEDIC SURGERY | Age: 77
End: 2022-05-25

## 2022-05-25 NOTE — TELEPHONE ENCOUNTER
Appointment Request   IF YOU HAVE SOMETHING BEFORE 5-31-22 IN [facility].   Patient requesting earlier appointment: Yes  Appointment offered to patient: 6-13-22  Patient Contact Number: [phone]

## 2022-05-25 NOTE — TELEPHONE ENCOUNTER
Patient left a message stating [address] is four times more than [facility] and is requesting a new script for BitePal. Patient's belongings returned

## 2022-05-25 NOTE — TELEPHONE ENCOUNTER
LVM EXPLAINING THAT WITH [facility], DR. Snow Saleh IS NOT IN [facility] AGAIN UNTIL NEXT Wednesday 6/1. OFFERED AN APPT FOR THEN AND ASKED HER TO CALL ME BACK DIRECTLY SO WE COULD FIGURE OUT THE BEST TIME.